# Patient Record
(demographics unavailable — no encounter records)

---

## 2025-03-10 NOTE — DISCUSSION/SUMMARY
[de-identified] : The MRI was reviewed with the patient in great details and all questions answered.  The patient has no pain left ankle.  He can now come out of the boot and off the crutches.  An ASO brace was given to transfer into a good supportive sneaker.  A physical therapy prescription was given for an ankle strength and conditioning program following ankle sprain and tendinitis protocol.  No invasive treatments are warranted.  He could slowly resume normal activities as tolerated with the help from his physical therapist over the next month.  Over-the-counter Tylenol as needed for pain.  No invasive treatments are warranted.  All questions answered.  Follow-up as needed.

## 2025-03-10 NOTE — HISTORY OF PRESENT ILLNESS
[FreeTextEntry1] : The patient is a 39-year-old male who presents with left ankle pain since 2/18/2025.  The patient rolled his left ankle while playing basketball.  He has an MRI of the left ankle.  He went to the urgent care.  He presents wearing a CAM boot with crutches.  He has minimal pain in office today and states that the pain and swelling has improved.  Denies fevers, chills, night sweats, SOB.  No other complaints.

## 2025-03-10 NOTE — PHYSICAL EXAM
[de-identified] : Left ankle Physical Examination:  General: Alert and oriented x3.  In no acute distress.  Pleasant in nature with a normal affect.  No apparent respiratory distress.  Erythema, Warmth, Rubor: Negative Swelling: Negative  ROM: 1. Dorsiflexion: 10 degrees 2. Plantarflexion: 40 degrees 3. Inversion: 30 degrees 4. Eversion: 20 degrees 5. Subtalar: 10 degrees  Tenderness to Palpation:  1. Lateral Malleolus: Negative 2. Medial Malleolus: Negative 3. Proximal Fibular Pain: Negative 4. Heel Pain: Negative 5. Cuboid: Negative 6. Navicular: Negative 7. Tibiotalar Joint: Negative 8. Subtalar Joint: Negative 9. Posterior Recess: Negative  Tendon Pain: 1. Achilles: Negative 2. Peroneals: Negative 3. Posterior Tibialis: Negative 4. Tibialis Anterior: Negative  Ligament Pain: 1. ATFL: Negative 2. CFL: Negative  3. PTFL: Negative 4. Deltoid Ligaments: Negative 5. Lis Franc Ligament: Negative  Stability:  1. Anterior Drawer: Negative 2. Posterior Drawer: Negative  Strength: 5/5 TA/GS/EHL  Pulses: 2+ DP/PT Pulses  Neuro: Intact motor and sensory  Additional Test: 1. Calcaneal Squeeze Test: Negative 2. Syndesmosis Squeeze Test: Negative [de-identified] : Radiology imaging reviewed in office with the patient on 3/10/2025 and I agree with the radiologist's impression below.  Office Location: [address] [place] [address], [place], [address] Office Phone: [phone] Office Fax: [phone] Patient Name: Hakeem Crowder Patient Phone Number: [phone] Patient ID: [id number] : 1986 Date of Exam: 2025 R. Phys. Name: Galindo George R. Phys. Address: [address] [place] [address], [facility] R. Phys. Phone: [phone] EXAM: MRI-LEFT ANKLE NON CONTRAST  HISTORY: Left ankle pain. Basketball injury 2 weeks ago.  COMPARISON: There are no prior studies available for comparison.  TECHNIQUE: MR imaging of the left ankle was performed without IV contrast on a 3.0 [place] strength MRI unit, utilizing the following pulse sequences: Axial proton density with and without fat suppression, sagittal proton density and STIR, coronal proton density.  FINDINGS: Bone/Osseous: No fracture. Subcortical bone marrow edema like signal and cystic changes noted in the lateral talar dome and appears degenerative. Minimal subcortical cystic/degenerative change on both sides of the posterior aspect of the subtalar joint. Findings are best demonstrated about sagittal image 20, series 6.  Joint fluid/Effusion: Small tibiotalar joint effusion. No evidence of loose body.  Articular Cartilage: No measurable defects but there is minimal intermediate signal within articular cartilage in the lateral talar dome, in the region of subcortical edema and cystic change consistent with mild chondromalacia  Other joints: The talonavicular, subtalar, and calcaneocuboid joints appear intact  Sinus Tarsi: Fat signal in the sinus tarsi is preserved.  Plantar Fascia: Intact. There is mild fusiform enlargement of the middle cord of the plantar fascia, proximally 2.6 cm deep to the origin, measuring up to 5.1 mm consistent with a fibroma. This measures 2 cm in longitudinal by 10 mm in TRV dimensions.  Tendons: The posterior tibialis tendon, at the level of the distal tibial metaphysis, appears slightly enlarged but normal in signal consistent with hydropic degeneration. Minimal increased signal, not fluid signal intensity, within insertional fibers of the tendon consistent with tendinosis. Note is made of mild posterior tibial tenosynovitis. The flexor, extensor, peroneal and Achilles tendons appear intact. Minimal increased signal within pre insertional fibers of the Achilles tendon consistent with tendinosis. Trace amount of fluid in the retrocalcaneal bursa is physiologic.  Ligaments: Syndesmotic ligaments appear intact. Talar insertional fibers of the anterior talofibular ligament appear torn. Near-complete if not complete tear of distal fibers of the calcaneofibular ligament but some ill-defined/indistinct fibers may still be intact, as seen about coronal image 17, series 7. Posterior talofibular ligament is intact. Indistinctness of deep fibers of the deltoid ligament complex consistent with grade II sprain but intact fibers are still seen.  Other: Subcutaneous edema is noted about the ankle but no measurable fluid collection demonstrated.  IMPRESSION:    1. Torn talar insertional fibers of the anterior talofibular ligament and near-complete if not complete tear of distal fibers of the calcaneofibular ligament. 2. Grade II sprain of the deep fibers of the deltoid ligament complex. 3. Mild insertional posterior tibial tendinosis and mild posterior tibial tenosynovitis. 4. Mild Achilles tendinosis. 5. 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 6. Mild degenerative changes in the posterior aspect of the subtalar joint. 7. Mild fusiform enlargement of the middle cord of the plantar fascia consistent with a fibroma. Please see above.    Electronically Signed By: Robbin Olivas M.D., on 3/3/2025 11:14 AM   SIGNED BY: Robbin Olivas M.D., Ext [id number] 2025 11:14 AM  IMPRESSION:    1. Torn talar insertional fibers of the anterior talofibular ligament and near-complete if not complete tear of distal fibers of the calcaneofibular ligament. 2. Grade II sprain of the deep fibers of the deltoid ligament complex. 3. Mild insertional posterior tibial tendinosis and mild posterior tibial tenosynovitis. 4. Mild Achilles tendinosis. 5. 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 6. Mild degenerative changes in the posterior aspect of the subtalar joint. 7. Mild fusiform enlargement of the middle cord of the plantar fascia consistent with a fibroma. Please see above.    Electronically Signed By: Robbin Olivas M.D., on 3/3/2025 11:14 AM   SIGNED BY: Robbin Olivas M.D., Ext [id number] 2025 11:14 AM